# Patient Record
(demographics unavailable — no encounter records)

---

## 2017-01-01 NOTE — PN
Interval History: 





Term male AGA infant born via  to an 17 yo  to 1 with normal PNL, GBS 

neg. mother 0+/baby O- DC neg.  initially depressed requiring PPV - responded 

well. Has been stable since.  


Method of Feeding: Breast feeding


Feeding Frequency: Every 2-3 Hours


Feeding Status: Without Difficulty


Stool Passed: Yes


Voiding: Yes





Measurements


Current Weight: 2.998 kg


Birthweight in lbs and ozs: 6 lbs and 10 oz


Length: 19.25 in


Head Circumference in inches: 13.5


Abdominal Girth in cm: 29


Abdominal Girth in inches: 11.417





Vitals


Vital Signs: 


 Vital Signs











  07/10/17 07/10/17 07/10/17





  03:25 03:59 04:45


 


Temperature 99.1 F 98.3 F 98.9 F


 


Pulse Rate 144 144 148


 


Respiratory 60 56 58





Rate   


 


O2 Sat by Pulse 95  





Oximetry   














  07/10/17 07/10/17 07/10/17





  05:50 06:45 07:30


 


Temperature 98.7 F 98.9 F 98.9 F


 


Pulse Rate 146 148 158


 


Respiratory 60 60 50





Rate   


 


O2 Sat by Pulse   





Oximetry   














 Physical Exam


General Appearance: Alert, Active


Skin Color: Normal


Level of Distress: No Distress


Neck: Normal Tone


Respiratory Effort: Normal


Respiratory Rate: Normal


Auscultation: Bilateral Good Air Exchange


Breath Sounds: NL Both Lungs


Rhythm: Regular


Abnormal Heart Sounds: No Murmurs, No S3, No S4


Umbilicus Assessment: Yes Normal


Abdomen: Normal


Abdomen Palpation: Liver Normal, Spleen Normal


Penis: Normal


Clavicles: Normal


Left Hip: Normal ROM


Right Hip: Normal ROM


Skin Texture: Smooth, Soft


Skin Appearance: No Abnormalities


Neuro: Normal: Marge, Sucking, Muscle Tone


Cranial Nerve Exam: Cranial N. II-XII Normal





Medications


Home Medications: 


 Home Medications











 Medication  Instructions  Recorded  Confirmed  Type


 


NK [No Home Medications Reported]  07/10/17 07/10/17 History











Inpatient Medications: 


 Medications





Dextrose (Glutose Oral Nicu*)  0 ml BUCCAL .SEE MD INSTRUCTIONS PRN; Protocol


   PRN Reason: ASYMTOMATIC HYPOGLYCEMIA











Results/Investigations


Lab Results: 


 











  07/10/17 07/10/17 07/10/17





  03:01 03:01 03:20


 


Cord Blood pH    7.27


 


Cord Blood PCO2    46


 


Cord Blood PO2    25


 


Cord Blood HCO3    18.8


 


Cord Base Excess    -6.0


 


Cord O2 Saturation    60.3


 


Total Bilirubin  2.10  


 


Blood Type   O Positive 


 


Direct Antiglob Test   Negative 














  07/10/17





  03:25


 


Cord Blood pH  7.16 L


 


Cord Blood PCO2  64 H


 


Cord Blood PO2  16 L


 


Cord Blood HCO3  16.2


 


Cord Base Excess  -7.6 L


 


Cord O2 Saturation  24.8


 


Total Bilirubin 


 


Blood Type 


 


Direct Antiglob Test 











Condition: Stable


Assessment: 





term aga male infant - initial respiratory depression - responded well to PPV - 

stable since. breastfeeding initiated. void/stool x 1. 


Plan of Care: 





Routine care. 


Provided Guidance to: Mother


Guidance and Instruction: signs of illness, feeding schedule/plan, signs of 

jaundice, sleeping position

## 2017-01-01 NOTE — PN
Interval History: 


 Intake and Output











 17





 06:59 07:59 08:59 09:59


 


Weight    6 lb 5.801 oz








Method of Feeding: Breast feeding, Bottle


Formula: Enfamil Lipil


Feeding Frequency: Ad Twila


Feeding Status: Without Difficulty


Maternal Nipple Condition: Bilateral Normal


Stool Passed: Yes


Voiding: Yes





Measurements


Current Weight: 6 lb 5.801 oz


Weight in lbs and ozs: 6 lbs and 6 oz


Weight Yesterday: 6 lb 7.388 oz


Weight Gain/Loss Since Last Weight In Grams: 45.0 Loss


Birth Weight: 6 lb 9.751 oz


Birthweight in lbs and ozs: 6 lbs and 10 oz


% Weight Gain/Loss from Birth Weight: 4% Loss


Length: 19.25 in - 17%ile


Head Circumference in inches: 13.5 - 33%ile


Abdominal Girth in cm: 29


Abdominal Girth in inches: 11.417





Vitals


Vital Signs: 


 Vital Signs











  17





  11:31 13:29 15:56


 


Temperature 98.9 F 98.1 F 98 F


 


Pulse Rate 124 144 142


 


Respiratory 36 40 44





Rate   














  17





  16:06 20:57 23:30


 


Temperature 98.4 F 98.9 F 98.7 F


 


Pulse Rate 140 128 120


 


Respiratory 48 38 36





Rate   














  17





  08:14


 


Temperature 98.4 F


 


Pulse Rate 142


 


Respiratory 44





Rate 














Medications


Home Medications: 


 Home Medications











 Medication  Instructions  Recorded  Confirmed  Type


 


NK [No Home Medications Reported]  07/10/17 07/10/17 History











Inpatient Medications: 


 Medications





Dextrose (Glutose Oral Nicu*)  0 ml BUCCAL .SEE MD INSTRUCTIONS PRN; Protocol


   PRN Reason: ASYMTOMATIC HYPOGLYCEMIA











Results/Investigations


Transcutaneous Bilirubin Result: 9.2


Time Obtained: 22:00


Age in Hours: 43


Risk Zone: Low Intermediate Risk


Bilirubin Comment: will report to oncoming RN


Major Jaundice Risk Factors: None


Minor Jaundice Risk Factors: Breastfeeding, Male


CCHD Screen: Passed


Lab Results: 


 











  07/10/17 07/10/17 07/10/17





  03:01 03:01 03:01


 


WBC   


 


RBC   


 


Hgb   


 


Hct   


 


MCV   


 


MCH   


 


MCHC   


 


RDW   


 


Plt Count   


 


MPV   


 


Neut % (Auto)   


 


Lymph % (Auto)   


 


Mono % (Auto)   


 


Eos % (Auto)   


 


Baso % (Auto)   


 


Absolute Neuts (auto)   


 


Absolute Lymphs (auto)   


 


Absolute Monos (auto)   


 


Absolute Eos (auto)   


 


Absolute Basos (auto)   


 


Absolute Nucleated RBC   


 


Neutrophils %   


 


Lymphocytes %   


 


Monocytes %   


 


Nucleated RBC %   


 


Nucleated RBCs/100 WBC   


 


Normal RBC Morphology   


 


Polychromasia   


 


Cord Blood pH   


 


Cord Blood PCO2   


 


Cord Blood PO2   


 


Cord Blood HCO3   


 


Cord Base Excess   


 


Cord O2 Saturation   


 


Total Bilirubin   2.10 


 


C-React Prot High Sens   


 


RPR  Nonreactive  


 


Blood Type    O Positive


 


Direct Antiglob Test    Negative














  07/10/17 07/10/17 07/10/17





  03:20 03:25 15:35


 


WBC    23.1


 


RBC    5.69


 


Hgb    21.0


 


Hct    63


 


MCV    111


 


MCH    37


 


MCHC    33


 


RDW    16 H


 


Plt Count    210


 


MPV    Not Reportable


 


Neut % (Auto)    76.6 H


 


Lymph % (Auto)    11.9 L


 


Mono % (Auto)    10.1 H


 


Eos % (Auto)    0.9


 


Baso % (Auto)    0.5


 


Absolute Neuts (auto)    17.7


 


Absolute Lymphs (auto)    2.7


 


Absolute Monos (auto)    2.3 H


 


Absolute Eos (auto)    0.2


 


Absolute Basos (auto)    0.1


 


Absolute Nucleated RBC    0.13


 


Neutrophils %    80 H


 


Lymphocytes %    17 L


 


Monocytes %    3


 


Nucleated RBC %    0.5


 


Nucleated RBCs/100 WBC    1


 


Normal RBC Morphology    Not Reportable


 


Polychromasia    1+


 


Cord Blood pH  7.27  7.16 L 


 


Cord Blood PCO2  46  64 H 


 


Cord Blood PO2  25  16 L 


 


Cord Blood HCO3  18.8  16.2 


 


Cord Base Excess  -6.0  -7.6 L 


 


Cord O2 Saturation  60.3  24.8 


 


Total Bilirubin   


 


C-React Prot High Sens   


 


RPR   


 


Blood Type   


 


Direct Antiglob Test   














  07/10/17





  15:35


 


WBC 


 


RBC 


 


Hgb 


 


Hct 


 


MCV 


 


MCH 


 


MCHC 


 


RDW 


 


Plt Count 


 


MPV 


 


Neut % (Auto) 


 


Lymph % (Auto) 


 


Mono % (Auto) 


 


Eos % (Auto) 


 


Baso % (Auto) 


 


Absolute Neuts (auto) 


 


Absolute Lymphs (auto) 


 


Absolute Monos (auto) 


 


Absolute Eos (auto) 


 


Absolute Basos (auto) 


 


Absolute Nucleated RBC 


 


Neutrophils % 


 


Lymphocytes % 


 


Monocytes % 


 


Nucleated RBC % 


 


Nucleated RBCs/100 WBC 


 


Normal RBC Morphology 


 


Polychromasia 


 


Cord Blood pH 


 


Cord Blood PCO2 


 


Cord Blood PO2 


 


Cord Blood HCO3 


 


Cord Base Excess 


 


Cord O2 Saturation 


 


Total Bilirubin 


 


C-React Prot High Sens  1.73


 


RPR 


 


Blood Type 


 


Direct Antiglob Test 











Assessment: 





Lactation Note:





FT AGA infant born 7/10/17 via  to a an 19 yo -1 mother with negative 

PNL, negative GBS. Mother O-; infant O+, negative LIDYA.  Initial infant 

respiratory distress, had PPV x ~30 seconds. Maternal history sig for 

significant mental health issues (depression, anxiety, borderline personality, 

self harm). Social work involved. Plan is to discharge today, family will be 

staying with infant's paternal grandparents. 





Mother has been combination feeding; latches most of the time but occasionally 

frustrated and has been supplemented with some formula; taking about 15 ml 

supplementation, but none in about 12 hours as per mother.  Infant just 

finished feeding in side lying position as I enter room.  





We reviewed tips for positioning so that infant is skin to skin with mother, 

with ear/shoulders/hips in alignment with belly rotated in towards mother. 

Reviewed the need to feed about every 2-3 hours. Reviewed tips for sleepy 

infant and tips to minimize pinching and encouraged wide open gape.  Disc. that 

if using bottle supplementation, 1 oz would be a good volume to feed. Will 

follow up tomorrow,  in our office with Brenda Haile RPA, IBCLC.

## 2017-01-01 NOTE — PN
Interval History: 





DOL n1 for thsi AGA product of FT gestation to 18 year old  mother with 

normal prenatal labs.  Some initial respiratory depression at birth, Apgars 5/7/

9, requiring 30 sec PPV.  Neonatalogist called and examined babe in first 1/2 

hour of life.  Stabilized quickly.





Mother with hx of significant mental health issues (depression, anxiety, self 

injury, MH hospitalization for SI; states she is borderline personality disorder

) There were some concerns raised by nursing staff re bonding iwth infant; may 

be that mother is not sure of what she should be doing to care for the babe.  

Nursing will be working closely with her over her stay. 


Method of Feeding: Breast feeding, Bottle


Feeding Frequency: Ad Twila


Feeding Status: Other - mother not showing alot of interest in nursing infant


Stool Passed: Yes


Stools in Past 24 Hours: 5


Voiding: Yes


Times Voided in Past 24 Hours: 6





Measurements


Current Weight: 2.931 kg


Weight in lbs and ozs: 6 lbs and 7 oz


Weight Yesterday: 2.998 kg


Weight Gain/Loss Since Last Weight In Grams: 67.0 Loss


Birth Weight: 2.998 kg


Birthweight in lbs and ozs: 6 lbs and 10 oz


% Weight Gain/Loss from Birth Weight: 2% Loss


Length: 19.25 in - 17%ile


Head Circumference in inches: 13.5 - 33%ile


Abdominal Girth in cm: 29


Abdominal Girth in inches: 11.417





Vitals


Vital Signs: 


 Vital Signs











  07/10/17 07/10/17 07/10/17





  12:00 16:00 20:07


 


Temperature 98.5 F 98.7 F 98.1 F


 


Pulse Rate 144 148 128


 


Respiratory 48 44 34





Rate   














  07/10/17 07/11/17





  23:21 04:13


 


Temperature 98.9 F 98.9 F


 


Pulse Rate 130 110


 


Respiratory 42 30





Rate  














 Physical Exam


General Appearance: Alert, Active


Skin Color: Normal


Level of Distress: No Distress


Neck: Normal Tone


Respiratory Effort: Normal


Respiratory Rate: Normal


Auscultation: Bilateral Good Air Exchange


Breath Sounds: NL Both Lungs


Rhythm: Regular


Abnormal Heart Sounds: No Murmurs, No S3, No S4


Umbilicus Assessment: Yes Normal


Abdomen: Normal


Abdomen Palpation: Liver Normal, Spleen Normal


Penis: Normal


Clavicles: Normal


Left Hip: Normal ROM


Right Hip: Normal ROM


Skin Texture: Smooth, Soft


Skin Appearance: No Abnormalities


Neuro: Normal: Marge, Sucking, Muscle Tone


Cranial Nerve Exam: Cranial N. II-XII Normal





Medications


Home Medications: 


 Home Medications











 Medication  Instructions  Recorded  Confirmed  Type


 


NK [No Home Medications Reported]  07/10/17 07/10/17 History











Inpatient Medications: 


 Medications





Dextrose (Glutose Oral Nicu*)  0 ml BUCCAL .SEE MD INSTRUCTIONS PRN; Protocol


   PRN Reason: ASYMTOMATIC HYPOGLYCEMIA











Results/Investigations


Transcutaneous Bilirubin Result: 5.2


Time Obtained: 05:00


Age in Hours: 27


Risk Zone: Low Intermediate Risk


Bilirubin Comment: will report to oncoming RN


CCHD Screen: Passed


Lab Results: 


 











  07/10/17 07/10/17 07/10/17





  03:01 03:01 03:01


 


WBC   


 


RBC   


 


Hgb   


 


Hct   


 


MCV   


 


MCH   


 


MCHC   


 


RDW   


 


Plt Count   


 


MPV   


 


Neut % (Auto)   


 


Lymph % (Auto)   


 


Mono % (Auto)   


 


Eos % (Auto)   


 


Baso % (Auto)   


 


Absolute Neuts (auto)   


 


Absolute Lymphs (auto)   


 


Absolute Monos (auto)   


 


Absolute Eos (auto)   


 


Absolute Basos (auto)   


 


Absolute Nucleated RBC   


 


Neutrophils %   


 


Lymphocytes %   


 


Monocytes %   


 


Nucleated RBC %   


 


Nucleated RBCs/100 WBC   


 


Normal RBC Morphology   


 


Polychromasia   


 


Cord Blood pH   


 


Cord Blood PCO2   


 


Cord Blood PO2   


 


Cord Blood HCO3   


 


Cord Base Excess   


 


Cord O2 Saturation   


 


Total Bilirubin   2.10 


 


C-React Prot High Sens   


 


RPR  Nonreactive  


 


Blood Type    O Positive


 


Direct Antiglob Test    Negative














  07/10/17 07/10/17 07/10/17





  03:20 03:25 15:35


 


WBC    23.1


 


RBC    5.69


 


Hgb    21.0


 


Hct    63


 


MCV    111


 


MCH    37


 


MCHC    33


 


RDW    16 H


 


Plt Count    210


 


MPV    Not Reportable


 


Neut % (Auto)    76.6 H


 


Lymph % (Auto)    11.9 L


 


Mono % (Auto)    10.1 H


 


Eos % (Auto)    0.9


 


Baso % (Auto)    0.5


 


Absolute Neuts (auto)    17.7


 


Absolute Lymphs (auto)    2.7


 


Absolute Monos (auto)    2.3 H


 


Absolute Eos (auto)    0.2


 


Absolute Basos (auto)    0.1


 


Absolute Nucleated RBC    0.13


 


Neutrophils %    80 H


 


Lymphocytes %    17 L


 


Monocytes %    3


 


Nucleated RBC %    0.5


 


Nucleated RBCs/100 WBC    1


 


Normal RBC Morphology    Not Reportable


 


Polychromasia    1+


 


Cord Blood pH  7.27  7.16 L 


 


Cord Blood PCO2  46  64 H 


 


Cord Blood PO2  25  16 L 


 


Cord Blood HCO3  18.8  16.2 


 


Cord Base Excess  -6.0  -7.6 L 


 


Cord O2 Saturation  60.3  24.8 


 


Total Bilirubin   


 


C-React Prot High Sens   


 


RPR   


 


Blood Type   


 


Direct Antiglob Test   














  07/10/17





  15:35


 


WBC 


 


RBC 


 


Hgb 


 


Hct 


 


MCV 


 


MCH 


 


MCHC 


 


RDW 


 


Plt Count 


 


MPV 


 


Neut % (Auto) 


 


Lymph % (Auto) 


 


Mono % (Auto) 


 


Eos % (Auto) 


 


Baso % (Auto) 


 


Absolute Neuts (auto) 


 


Absolute Lymphs (auto) 


 


Absolute Monos (auto) 


 


Absolute Eos (auto) 


 


Absolute Basos (auto) 


 


Absolute Nucleated RBC 


 


Neutrophils % 


 


Lymphocytes % 


 


Monocytes % 


 


Nucleated RBC % 


 


Nucleated RBCs/100 WBC 


 


Normal RBC Morphology 


 


Polychromasia 


 


Cord Blood pH 


 


Cord Blood PCO2 


 


Cord Blood PO2 


 


Cord Blood HCO3 


 


Cord Base Excess 


 


Cord O2 Saturation 


 


Total Bilirubin 


 


C-React Prot High Sens  1.73


 


RPR 


 


Blood Type 


 


Direct Antiglob Test 











Condition: Stable


Assessment: 





Term male infant to teen parents who appear to be engaged and trying hard. They 

will be living with father's family. Will continue to monitor.  SW consult 

pending.


Plan of Care: 





Routine care, with nursing support.


Anticipate discharge tomorrow.
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
